# Patient Record
Sex: FEMALE | Race: OTHER | NOT HISPANIC OR LATINO | URBAN - METROPOLITAN AREA
[De-identification: names, ages, dates, MRNs, and addresses within clinical notes are randomized per-mention and may not be internally consistent; named-entity substitution may affect disease eponyms.]

---

## 2018-11-18 ENCOUNTER — EMERGENCY (EMERGENCY)
Facility: HOSPITAL | Age: 83
LOS: 1 days | Discharge: HOME | End: 2018-11-18
Attending: EMERGENCY MEDICINE | Admitting: EMERGENCY MEDICINE
Payer: MEDICARE

## 2018-11-18 VITALS
HEIGHT: 59 IN | OXYGEN SATURATION: 99 % | TEMPERATURE: 98 F | RESPIRATION RATE: 85 BRPM | WEIGHT: 95.46 LBS | HEART RATE: 85 BPM | SYSTOLIC BLOOD PRESSURE: 175 MMHG | DIASTOLIC BLOOD PRESSURE: 91 MMHG

## 2018-11-18 DIAGNOSIS — R41.3 OTHER AMNESIA: ICD-10-CM

## 2018-11-18 LAB
ANION GAP SERPL CALC-SCNC: 10 MMOL/L — SIGNIFICANT CHANGE UP (ref 5–17)
APPEARANCE UR: CLEAR — SIGNIFICANT CHANGE UP
BASOPHILS NFR BLD AUTO: 0.4 % — SIGNIFICANT CHANGE UP (ref 0–2)
BILIRUB UR-MCNC: NEGATIVE — SIGNIFICANT CHANGE UP
BUN SERPL-MCNC: 10 MG/DL — SIGNIFICANT CHANGE UP (ref 7–23)
CALCIUM SERPL-MCNC: 8.7 MG/DL — SIGNIFICANT CHANGE UP (ref 8.4–10.5)
CHLORIDE SERPL-SCNC: 102 MMOL/L — SIGNIFICANT CHANGE UP (ref 96–108)
CO2 SERPL-SCNC: 27 MMOL/L — SIGNIFICANT CHANGE UP (ref 22–31)
COLOR SPEC: YELLOW — SIGNIFICANT CHANGE UP
CREAT SERPL-MCNC: 0.39 MG/DL — LOW (ref 0.5–1.3)
DIFF PNL FLD: NEGATIVE — SIGNIFICANT CHANGE UP
EOSINOPHIL NFR BLD AUTO: 1 % — SIGNIFICANT CHANGE UP (ref 0–6)
GLUCOSE SERPL-MCNC: 97 MG/DL — SIGNIFICANT CHANGE UP (ref 70–99)
GLUCOSE UR QL: NEGATIVE — SIGNIFICANT CHANGE UP
HCT VFR BLD CALC: 36.5 % — SIGNIFICANT CHANGE UP (ref 34.5–45)
HGB BLD-MCNC: 12 G/DL — SIGNIFICANT CHANGE UP (ref 11.5–15.5)
KETONES UR-MCNC: NEGATIVE — SIGNIFICANT CHANGE UP
LEUKOCYTE ESTERASE UR-ACNC: NEGATIVE — SIGNIFICANT CHANGE UP
LYMPHOCYTES # BLD AUTO: 21.3 % — SIGNIFICANT CHANGE UP (ref 13–44)
MCHC RBC-ENTMCNC: 30.8 PG — SIGNIFICANT CHANGE UP (ref 27–34)
MCHC RBC-ENTMCNC: 32.9 G/DL — SIGNIFICANT CHANGE UP (ref 32–36)
MCV RBC AUTO: 93.8 FL — SIGNIFICANT CHANGE UP (ref 80–100)
MONOCYTES NFR BLD AUTO: 11.2 % — SIGNIFICANT CHANGE UP (ref 2–14)
NEUTROPHILS NFR BLD AUTO: 66.1 % — SIGNIFICANT CHANGE UP (ref 43–77)
NITRITE UR-MCNC: NEGATIVE — SIGNIFICANT CHANGE UP
PH UR: 8.5 — HIGH (ref 5–8)
PLATELET # BLD AUTO: 312 K/UL — SIGNIFICANT CHANGE UP (ref 150–400)
POTASSIUM SERPL-MCNC: 3.8 MMOL/L — SIGNIFICANT CHANGE UP (ref 3.5–5.3)
POTASSIUM SERPL-SCNC: 3.8 MMOL/L — SIGNIFICANT CHANGE UP (ref 3.5–5.3)
PROT UR-MCNC: NEGATIVE MG/DL — SIGNIFICANT CHANGE UP
RBC # BLD: 3.89 M/UL — SIGNIFICANT CHANGE UP (ref 3.8–5.2)
RBC # FLD: 14.3 % — SIGNIFICANT CHANGE UP (ref 10.3–16.9)
SODIUM SERPL-SCNC: 139 MMOL/L — SIGNIFICANT CHANGE UP (ref 135–145)
SP GR SPEC: <=1.005 — SIGNIFICANT CHANGE UP (ref 1–1.03)
UROBILINOGEN FLD QL: 0.2 E.U./DL — SIGNIFICANT CHANGE UP
WBC # BLD: 5.3 K/UL — SIGNIFICANT CHANGE UP (ref 3.8–10.5)
WBC # FLD AUTO: 5.3 K/UL — SIGNIFICANT CHANGE UP (ref 3.8–10.5)

## 2018-11-18 PROCEDURE — 99284 EMERGENCY DEPT VISIT MOD MDM: CPT

## 2018-11-18 PROCEDURE — 70450 CT HEAD/BRAIN W/O DYE: CPT

## 2018-11-18 PROCEDURE — 85025 COMPLETE CBC W/AUTO DIFF WBC: CPT

## 2018-11-18 PROCEDURE — 80048 BASIC METABOLIC PNL TOTAL CA: CPT

## 2018-11-18 PROCEDURE — 87086 URINE CULTURE/COLONY COUNT: CPT

## 2018-11-18 PROCEDURE — 87186 SC STD MICRODIL/AGAR DIL: CPT

## 2018-11-18 PROCEDURE — 82962 GLUCOSE BLOOD TEST: CPT

## 2018-11-18 PROCEDURE — 70450 CT HEAD/BRAIN W/O DYE: CPT | Mod: 26

## 2018-11-18 PROCEDURE — 99284 EMERGENCY DEPT VISIT MOD MDM: CPT | Mod: 25

## 2018-11-18 PROCEDURE — 81003 URINALYSIS AUTO W/O SCOPE: CPT

## 2018-11-18 RX ORDER — AMLODIPINE BESYLATE 2.5 MG/1
5 TABLET ORAL ONCE
Qty: 0 | Refills: 0 | Status: COMPLETED | OUTPATIENT
Start: 2018-11-18 | End: 2018-11-18

## 2018-11-18 RX ADMIN — AMLODIPINE BESYLATE 5 MILLIGRAM(S): 2.5 TABLET ORAL at 21:21

## 2018-11-18 NOTE — ED ADULT NURSE NOTE - NSIMPLEMENTINTERV_GEN_ALL_ED
Implemented All Universal Safety Interventions:  [place] to call system. Call bell, personal items and telephone within reach. Instruct patient to call for assistance. Room bathroom lighting operational. Non-slip footwear when patient is off stretcher. Physically safe environment: no spills, clutter or unnecessary equipment. Stretcher in lowest position, wheels locked, appropriate side rails in place.

## 2018-11-18 NOTE — ED PROVIDER NOTE - PHYSICAL EXAMINATION
Constitutional: Well appearing, well nourished, awake, alert, oriented to person, place, and in no apparent distress.  Head atraumatic, normocephalic. No signs of trauma  ENMT: Airway patent. Normal MM  Eyes: Clear bilaterally. PERRL. EOMI.   Cardiac: Normal rate, regular rhythm.  Heart sounds S1, S2.  No murmurs, rubs or gallops.  Respiratory: Breaths sounds equal and clear b/l. No increased WOB, tachypnea, hypoxia, or accessory mm use. Pt speaks in full sentences.   Gastrointestinal: Abd soft, NT, ND, NABS. No guarding, rebound, or rigidity. No pulsatile abdominal masses. No organomegaly appreciated. No CVAT   Musculoskeletal: Range of motion is not limited. FROM all joints x 4 ext w/o dec ROM, pain, or signs of trauma  Neuro: Alert & Oriented x 3. CN II-XII intact. No facial droop. Clear speech. TAYLOR w/ 5/5 strength x 4 ext. Normal sensation. No pronator drift. No dysdidokinesia nor dysmetria. Normal heel-to-shin. Normal gait  Skin: Skin normal color for race, warm, dry and intact. No evidence of rash.  Psych: Alert and oriented to person, place. normal mood and affect. no apparent risk to self or others.

## 2018-11-18 NOTE — ED PROVIDER NOTE - PROGRESS NOTE DETAILS
D/w MICHEAL Titus: given no acute medical issues, pt can be discharge and CM will f/u with the pt tomorrow. Pt wants to go home. Pt ambulating in the ED w/o difficulty. Will get pt transportation home to ensure safe arrival.

## 2018-11-18 NOTE — ED ADULT TRIAGE NOTE - CHIEF COMPLAINT QUOTE
" I had a blood in my urine" Patient also unable to recall the reason for visit at beginning of triage, looks confused, short term memory loss

## 2018-11-18 NOTE — ED ADULT NURSE NOTE - OBJECTIVE STATEMENT
patient presents to the ed complaining of hematuria that she noted today. patient denies chest pain, shortness of breath, nausea, vomiting, diarrhea, and/or burning with urination. patient is a poor historian, alert and oriented x2 with periods of forgetfulness

## 2018-11-18 NOTE — ED PROVIDER NOTE - OBJECTIVE STATEMENT
Pt w/ no sig PMHx walked into the ED for unclear reasons. Pt reports she does not recall why she is here. Pt reports she lives nearby on [address], by herself. Pt reports she was going to a store when she decided to come here. Pt's triage reports hematuria, but when asked about urine complaints, pt denies. Pt reports she has no family here. Pt reports her closest friend lives in [place], and states she does not want me to call him / her. Pt denies medical problems and states she does not take any medications. Denies HA, blurred vision, neck pain, paresthesias, focal weakness, CP, SOB, abd pain, dysuria. Denies recent falls. Denies pain of any kind. Pt admits she is forgetful, and is uncertain if she is always forgetful.

## 2018-11-18 NOTE — ED PROVIDER NOTE - MEDICAL DECISION MAKING DETAILS
Pt presented to the ED for unclear reasons. Pt afebrile rectally, non toxic. No evidence of infection by exam or hx. Pt not alert to time, and with short term memory loss, but otherwise clear in speaking, and does not appear confused. Non focal neuro exam. no signs of trauma. Check labs, UA, CT head. Asymptomatic HTN, will give medication. Dispo pending w/u and clinical status

## 2018-11-19 VITALS
SYSTOLIC BLOOD PRESSURE: 168 MMHG | RESPIRATION RATE: 18 BRPM | DIASTOLIC BLOOD PRESSURE: 80 MMHG | TEMPERATURE: 98 F | OXYGEN SATURATION: 100 % | HEART RATE: 80 BPM

## 2018-12-03 ENCOUNTER — EMERGENCY (EMERGENCY)
Facility: HOSPITAL | Age: 83
LOS: 1 days | Discharge: HOME | End: 2018-12-03
Attending: EMERGENCY MEDICINE | Admitting: EMERGENCY MEDICINE
Payer: MEDICARE

## 2018-12-03 VITALS
TEMPERATURE: 97 F | HEART RATE: 98 BPM | SYSTOLIC BLOOD PRESSURE: 130 MMHG | WEIGHT: 97 LBS | DIASTOLIC BLOOD PRESSURE: 67 MMHG | RESPIRATION RATE: 18 BRPM | OXYGEN SATURATION: 99 %

## 2018-12-03 DIAGNOSIS — N93.9 ABNORMAL UTERINE AND VAGINAL BLEEDING, UNSPECIFIED: ICD-10-CM

## 2018-12-03 PROCEDURE — 99282 EMERGENCY DEPT VISIT SF MDM: CPT

## 2018-12-03 PROCEDURE — 93010 ELECTROCARDIOGRAM REPORT: CPT

## 2018-12-03 PROCEDURE — 99285 EMERGENCY DEPT VISIT HI MDM: CPT | Mod: 25

## 2018-12-04 VITALS
SYSTOLIC BLOOD PRESSURE: 159 MMHG | HEART RATE: 85 BPM | OXYGEN SATURATION: 98 % | DIASTOLIC BLOOD PRESSURE: 89 MMHG | TEMPERATURE: 98 F | RESPIRATION RATE: 19 BRPM

## 2018-12-04 DIAGNOSIS — F03.90 UNSPECIFIED DEMENTIA WITHOUT BEHAVIORAL DISTURBANCE: ICD-10-CM

## 2018-12-04 LAB
ALBUMIN SERPL ELPH-MCNC: 4.2 G/DL — SIGNIFICANT CHANGE UP (ref 3.3–5)
ALP SERPL-CCNC: 114 U/L — SIGNIFICANT CHANGE UP (ref 40–120)
ALT FLD-CCNC: 21 U/L — SIGNIFICANT CHANGE UP (ref 10–45)
ANION GAP SERPL CALC-SCNC: 11 MMOL/L — SIGNIFICANT CHANGE UP (ref 5–17)
APPEARANCE UR: CLEAR — SIGNIFICANT CHANGE UP
APTT BLD: 30.1 SEC — SIGNIFICANT CHANGE UP (ref 27.5–36.3)
AST SERPL-CCNC: 27 U/L — SIGNIFICANT CHANGE UP (ref 10–40)
BASOPHILS NFR BLD AUTO: 0.4 % — SIGNIFICANT CHANGE UP (ref 0–2)
BILIRUB SERPL-MCNC: 0.3 MG/DL — SIGNIFICANT CHANGE UP (ref 0.2–1.2)
BILIRUB UR-MCNC: NEGATIVE — SIGNIFICANT CHANGE UP
BLD GP AB SCN SERPL QL: NEGATIVE — SIGNIFICANT CHANGE UP
BUN SERPL-MCNC: 17 MG/DL — SIGNIFICANT CHANGE UP (ref 7–23)
CALCIUM SERPL-MCNC: 8.5 MG/DL — SIGNIFICANT CHANGE UP (ref 8.4–10.5)
CHLORIDE SERPL-SCNC: 107 MMOL/L — SIGNIFICANT CHANGE UP (ref 96–108)
CO2 SERPL-SCNC: 25 MMOL/L — SIGNIFICANT CHANGE UP (ref 22–31)
COLOR SPEC: YELLOW — SIGNIFICANT CHANGE UP
CREAT SERPL-MCNC: 0.42 MG/DL — LOW (ref 0.5–1.3)
DIFF PNL FLD: NEGATIVE — SIGNIFICANT CHANGE UP
EOSINOPHIL NFR BLD AUTO: 3.4 % — SIGNIFICANT CHANGE UP (ref 0–6)
GLUCOSE SERPL-MCNC: 136 MG/DL — HIGH (ref 70–99)
GLUCOSE UR QL: NEGATIVE — SIGNIFICANT CHANGE UP
HCT VFR BLD CALC: 35.6 % — SIGNIFICANT CHANGE UP (ref 34.5–45)
HGB BLD-MCNC: 11.9 G/DL — SIGNIFICANT CHANGE UP (ref 11.5–15.5)
INR BLD: 0.97 — SIGNIFICANT CHANGE UP (ref 0.88–1.16)
KETONES UR-MCNC: NEGATIVE — SIGNIFICANT CHANGE UP
LEUKOCYTE ESTERASE UR-ACNC: NEGATIVE — SIGNIFICANT CHANGE UP
LYMPHOCYTES # BLD AUTO: 16.4 % — SIGNIFICANT CHANGE UP (ref 13–44)
MCHC RBC-ENTMCNC: 31.2 PG — SIGNIFICANT CHANGE UP (ref 27–34)
MCHC RBC-ENTMCNC: 33.4 G/DL — SIGNIFICANT CHANGE UP (ref 32–36)
MCV RBC AUTO: 93.2 FL — SIGNIFICANT CHANGE UP (ref 80–100)
MONOCYTES NFR BLD AUTO: 11.6 % — SIGNIFICANT CHANGE UP (ref 2–14)
NEUTROPHILS NFR BLD AUTO: 68.2 % — SIGNIFICANT CHANGE UP (ref 43–77)
NITRITE UR-MCNC: NEGATIVE — SIGNIFICANT CHANGE UP
PCP SPEC-MCNC: SIGNIFICANT CHANGE UP
PH UR: 7 — SIGNIFICANT CHANGE UP (ref 5–8)
PLATELET # BLD AUTO: 348 K/UL — SIGNIFICANT CHANGE UP (ref 150–400)
POTASSIUM SERPL-MCNC: 4.4 MMOL/L — SIGNIFICANT CHANGE UP (ref 3.5–5.3)
POTASSIUM SERPL-SCNC: 4.4 MMOL/L — SIGNIFICANT CHANGE UP (ref 3.5–5.3)
PROT SERPL-MCNC: 6.9 G/DL — SIGNIFICANT CHANGE UP (ref 6–8.3)
PROT UR-MCNC: NEGATIVE MG/DL — SIGNIFICANT CHANGE UP
PROTHROM AB SERPL-ACNC: 10.9 SEC — SIGNIFICANT CHANGE UP (ref 10–12.9)
RBC # BLD: 3.82 M/UL — SIGNIFICANT CHANGE UP (ref 3.8–5.2)
RBC # FLD: 14.2 % — SIGNIFICANT CHANGE UP (ref 10.3–16.9)
RH IG SCN BLD-IMP: POSITIVE — SIGNIFICANT CHANGE UP
SODIUM SERPL-SCNC: 143 MMOL/L — SIGNIFICANT CHANGE UP (ref 135–145)
SP GR SPEC: 1.02 — SIGNIFICANT CHANGE UP (ref 1–1.03)
UROBILINOGEN FLD QL: 0.2 E.U./DL — SIGNIFICANT CHANGE UP
WBC # BLD: 5 K/UL — SIGNIFICANT CHANGE UP (ref 3.8–10.5)
WBC # FLD AUTO: 5 K/UL — SIGNIFICANT CHANGE UP (ref 3.8–10.5)

## 2018-12-04 PROCEDURE — 86850 RBC ANTIBODY SCREEN: CPT

## 2018-12-04 PROCEDURE — 85025 COMPLETE CBC W/AUTO DIFF WBC: CPT

## 2018-12-04 PROCEDURE — 70450 CT HEAD/BRAIN W/O DYE: CPT

## 2018-12-04 PROCEDURE — 70450 CT HEAD/BRAIN W/O DYE: CPT | Mod: 26,77

## 2018-12-04 PROCEDURE — 81003 URINALYSIS AUTO W/O SCOPE: CPT

## 2018-12-04 PROCEDURE — 96374 THER/PROPH/DIAG INJ IV PUSH: CPT

## 2018-12-04 PROCEDURE — 99285 EMERGENCY DEPT VISIT HI MDM: CPT | Mod: 25

## 2018-12-04 PROCEDURE — 80053 COMPREHEN METABOLIC PANEL: CPT

## 2018-12-04 PROCEDURE — 86900 BLOOD TYPING SEROLOGIC ABO: CPT

## 2018-12-04 PROCEDURE — 86901 BLOOD TYPING SEROLOGIC RH(D): CPT

## 2018-12-04 PROCEDURE — 93005 ELECTROCARDIOGRAM TRACING: CPT

## 2018-12-04 PROCEDURE — 85730 THROMBOPLASTIN TIME PARTIAL: CPT

## 2018-12-04 PROCEDURE — 80307 DRUG TEST PRSMV CHEM ANLYZR: CPT

## 2018-12-04 PROCEDURE — 87086 URINE CULTURE/COLONY COUNT: CPT

## 2018-12-04 PROCEDURE — 70450 CT HEAD/BRAIN W/O DYE: CPT | Mod: 26

## 2018-12-04 PROCEDURE — 85610 PROTHROMBIN TIME: CPT

## 2018-12-04 PROCEDURE — 36415 COLL VENOUS BLD VENIPUNCTURE: CPT

## 2018-12-04 PROCEDURE — 90792 PSYCH DIAG EVAL W/MED SRVCS: CPT | Mod: GT

## 2018-12-04 PROCEDURE — 76856 US EXAM PELVIC COMPLETE: CPT

## 2018-12-04 PROCEDURE — 76856 US EXAM PELVIC COMPLETE: CPT | Mod: 26

## 2018-12-04 RX ORDER — MORPHINE SULFATE 50 MG/1
2 CAPSULE, EXTENDED RELEASE ORAL ONCE
Qty: 0 | Refills: 0 | Status: DISCONTINUED | OUTPATIENT
Start: 2018-12-04 | End: 2018-12-04

## 2018-12-04 RX ORDER — HALOPERIDOL DECANOATE 100 MG/ML
0.5 INJECTION INTRAMUSCULAR ONCE
Qty: 0 | Refills: 0 | Status: DISCONTINUED | OUTPATIENT
Start: 2018-12-04 | End: 2018-12-07

## 2018-12-04 RX ORDER — SODIUM CHLORIDE 9 MG/ML
1000 INJECTION INTRAMUSCULAR; INTRAVENOUS; SUBCUTANEOUS ONCE
Qty: 0 | Refills: 0 | Status: COMPLETED | OUTPATIENT
Start: 2018-12-04 | End: 2018-12-04

## 2018-12-04 RX ADMIN — SODIUM CHLORIDE 1000 MILLILITER(S): 9 INJECTION INTRAMUSCULAR; INTRAVENOUS; SUBCUTANEOUS at 04:30

## 2018-12-04 RX ADMIN — MORPHINE SULFATE 2 MILLIGRAM(S): 50 CAPSULE, EXTENDED RELEASE ORAL at 04:29

## 2018-12-04 RX ADMIN — MORPHINE SULFATE 2 MILLIGRAM(S): 50 CAPSULE, EXTENDED RELEASE ORAL at 02:21

## 2018-12-04 NOTE — ED ADULT NURSE NOTE - CHPI ED NUR SYMPTOMS NEG
no pain/no discharge/no fever/no nausea/no back pain/no abdominal pain/no coffee grounds emesis/no vomiting

## 2018-12-04 NOTE — CONSULT NOTE ADULT - ASSESSMENT
84y post menopausal G0 presenting to ED today for vaginal bleeding. Patient currently has altered mental status and unable to fully evaluate.   - patient uncooperative and refused vaginal exam and refused transvaginal ultrasound   -patient is hemodynamically stable, Hemoglobin  stable, and scant blood noted on maxi pad, thus urgent vaginal exam is not necessary at this time, would recommend that patient follow up outpatient with GYN provider to evaluate abnormal post menopausal vaginal bleeding including a transvaginal ultrasound to full evaluate endometrial lining  - recommend that patient is admitted to medicine/neurology/psychiatry given altered mental status at this time with no care takers available for patient and unable to fully evaluate patient   -gyn will continue to follow patient if admitted and once patient is more orientated than will reevaluate for possible vaginal exam and sonogram  - if bleeding increases significantly or patient becomes hemodynamically unstable, please urgently contact GYN team at  [phone]  plan d/w Dr. Marianne Cabrera

## 2018-12-04 NOTE — ED BEHAVIORAL HEALTH ASSESSMENT NOTE - OTHER
unclear thin intermittently cooperative with laina pt in bed denies SI/HI. Likely delusions/confabulations regarding working pt does not participate in full cognitive/ language evaluation

## 2018-12-04 NOTE — ED BEHAVIORAL HEALTH ASSESSMENT NOTE - SUMMARY
Patient is an 85 y/o F [place] single, noncaregiver, reportedly domiciled alone, reportedly employed "in the decorative market", with unclear PPhx or PMhx, denies hx of inpt hospitalizations, denies hx of suicide attempts/violence, denies legal problems, and denies substance use. Patient self presents today for vaginal bleeding. Psych was consulted as patient appears confused, forgetful, and disoriented. On evaluation pt appears to have gross cognitive deficits, however she does not fully participate in more detailed cognitive testing. She is unable to reasonably discuss her ability to care for ADLs at home and does not appear to have any social supports. At this time pt requires further medical/neuro workup in a safe setting. Would recommend medical admission.

## 2018-12-04 NOTE — ED PROVIDER NOTE - PROGRESS NOTE DETAILS
GYN consulted for vaginal bleeding. Evaluated - will continue to follow progress. Neurology consulted - will evaluate and follow as per Dr. Quintana. Psych evaluated and will continue to follow. Neurosurgery consulted for subdural. signed out to [place] by Dr. Mata - presented for vaginal bleeding with minimal bleeding on exam, seen by GYN no acute intervention for bleeding, but pt altered and delusional and ct shows subdural without known trauma but pt confused, psych also eval pt in ED - pending consultation from NS at dispo.

## 2018-12-04 NOTE — CONSULT NOTE ADULT - SUBJECTIVE AND OBJECTIVE BOX
HISTORY OF PRESENT ILLNESS: 84F presented with above.  Patient is poor historian.  When asked about what brought her to the hospital today, she cannot recall why.  Per previous documentation, vaginal bleeding appears to be presenting symptom.  Given patient's poor mentation, CT scan was performed, which revealed mixed density interhemispheric SDH.  Upon further questioning, patients states that she hit her head on kitchen cabinet within the last week, but does not recall when exactly.  Denies anticoagulant use.  Denies headaches, n/v, visual disturbances, weakness.  Patient appears to be delusional, forgetful, and tangential during interview    PAST MEDICAL & SURGICAL HISTORY:  No pertinent past medical history  No significant past surgical history    FAMILY HISTORY:  -unable to obtain due to patient's mental status      SOCIAL HISTORY:  Tobacco Use: unknown  EtOH use: unknown  Substance: unknown    Allergies    No Known Allergies    Intolerances        REVIEW OF SYSTEMS  General: see HPI	  Skin/Breast: denies  Ophthalmologic: denies  ENMT:	denies  Respiratory and Thorax: denies  Cardiovascular:	denies  Gastrointestinal:	denies  Genitourinary:	see HPI  Musculoskeletal:	 denies  Neurological:	see HPI  Psychiatric:	see HPI  Hematology/Lymphatics: denies	  Endocrine:	denies  Allergic/Immunologic: denies	      MEDICATIONS:  Antibiotics:    Neuro:  haloperidol    Injectable 0.5 milliGRAM(s) IV Push Once PRN    Anticoagulation:    OTHER:    IVF:      Vital Signs Last 24 Hrs  T(C): 37 (04 Dec 2018 01:43), Max: 37 (04 Dec 2018 01:43)  T(F): 98.6 (04 Dec 2018 01:43), Max: 98.6 (04 Dec 2018 01:43)  HR: 90 (04 Dec 2018 01:43) (90 - 98)  BP: 114/61 (04 Dec 2018 01:43) (114/61 - 130/67)  BP(mean): --  RR: 18 (04 Dec 2018 01:43) (18 - 18)  SpO2: 98% (04 Dec 2018 01:43) (98% - 99%)    PHYSICAL EXAM:  Constitutional: NAD, alert and awake  Eyes: PERRL, EOMI  Respiratory: CTA throughout  Cardiovascular: S1 S2 RRR  Genitourinary: deferred  Neurological: Oriented x 2 (not time), follows commands, speech clear  TAYLOR 5/5 strength  Sensation intact light touch all dermatomes  CN II-XII grossly intact      LABS:                        11.9   5.0   )-----------( 348      ( 04 Dec 2018 00:23 )             35.6     12    143  |  107  |  17  ----------------------------<  136<H>  4.4   |  25  |  0.42<L>    Ca    8.5      04 Dec 2018 00:23    TPro  6.9  /  Alb  4.2  /  TBili  0.3  /  DBili  x   /  AST  27  /  ALT  21  /  AlkPhos  114  1204    PT/INR - ( 04 Dec 2018 00:24 )   PT: 10.9 sec;   INR: 0.97          PTT - ( 04 Dec 2018 00:24 )  PTT:30.1 sec  Urinalysis Basic - ( 04 Dec 2018 05:56 )    Color: Yellow / Appearance: Clear / S.020 / pH: x  Gluc: x / Ketone: NEGATIVE  / Bili: Negative / Urobili: 0.2 E.U./dL   Blood: x / Protein: NEGATIVE mg/dL / Nitrite: NEGATIVE   Leuk Esterase: NEGATIVE / RBC: x / WBC x   Sq Epi: x / Non Sq Epi: x / Bacteria: x        RADIOLOGY & ADDITIONAL STUDIES: See HPI    A/P:  84F w/ interhemispheric SDH s/p trauma date unknown, neuro intact  -Repeat head CT 6 hours to ensure stability  -If stable can be discharged from ED  -No anti seizure meds necessary due to size and location of subdural and potential to worsen mental status if administered  -Case, plan and imaging discussed with Dr. Denis  -Will review repeat imaging when performed

## 2018-12-04 NOTE — ED ADULT NURSE NOTE - NSIMPLEMENTINTERV_GEN_ALL_ED
Implemented All Fall with Harm Risk Interventions:  [place] to call system. Call bell, personal items and telephone within reach. Instruct patient to call for assistance. Room bathroom lighting operational. Non-slip footwear when patient is off stretcher. Physically safe environment: no spills, clutter or unnecessary equipment. Stretcher in lowest position, wheels locked, appropriate side rails in place. Provide visual cue, wrist band, yellow gown, etc. Monitor gait and stability. Monitor for mental status changes and reorient to person, place, and time. Review medications for side effects contributing to fall risk. Reinforce activity limits and safety measures with patient and family. Provide visual clues: red socks.

## 2018-12-04 NOTE — ED PROVIDER NOTE - OBJECTIVE STATEMENT
83 y/o female present in ED c/o vaginal bleeding x1 day. Pt reports noticing bright red blood in her underwear earlier today. She describes the blood as bright red. 85 y/o female present in ED c/o vaginal bleeding x1 day. Pt reports noticing bright red blood in her underwear earlier today. She describes the blood as bright red. States she soiled x1 maxi pad. She denies the following: dizziness, sob, difficulty breathing, hx of vaginal bleeding, hemorrhoids, fissure, painful bm, trauma, pelvic pain, urinary symptoms.

## 2018-12-04 NOTE — ED PROVIDER NOTE - ATTENDING CONTRIBUTION TO CARE
I have seen/examined the pt, reviewed all pertinent clinical data, and I agree with the documentation, care, and plan executed by RAGHU Edward.

## 2018-12-04 NOTE — ED BEHAVIORAL HEALTH ASSESSMENT NOTE - RISK ASSESSMENT
Risk factors include no social supports, gross cognitive deficits, unable to reasonably discuss her ability to care for ADLs, poor insight into illness.

## 2018-12-04 NOTE — ED BEHAVIORAL HEALTH ASSESSMENT NOTE - DESCRIPTION
calm and cooperative on arrival, initially states she is here for vaginal bleeding. Later during EM evaluation pt does not remember why she is in the hospital. She was initially resistant to psych eval, but was able to minimally participate with cooperation. Nothing of note found in patient's belongings (no contact sources found). Patient is not agitated and did not require PRNs. pt received morphine 2mg IV for pelvic pain.    CBC, coags, and CMP WNL. Pelvic US completed (nondiagnostic).    Vital Signs Last 24 Hrs  T(C): 37 (04 Dec 2018 01:43), Max: 37 (04 Dec 2018 01:43)  T(F): 98.6 (04 Dec 2018 01:43), Max: 98.6 (04 Dec 2018 01:43)  HR: 90 (04 Dec 2018 01:43) (90 - 98)  BP: 114/61 (04 Dec 2018 01:43) (114/61 - 130/67)  BP(mean): --  RR: 18 (04 Dec 2018 01:43) (18 - 18)  SpO2: 98% (04 Dec 2018 01:43) (98% - 99%) denies see HPI

## 2018-12-04 NOTE — ED PROVIDER NOTE - MEDICAL DECISION MAKING DETAILS
85 y/o female well-appearing. Upon initial assessment, pt states she does not know why she is here in the ED. States she was taking a walk. With further evaluation, she remembered having vaginal bleeding with some discomfort. Mild trace bleeding. No rectal bleeding noted. 83 y/o female well-appearing. Upon initial assessment, pt states she does not know why she is here in the ED. States she was taking a walk. With further evaluation, she remembered having vaginal bleeding with some discomfort. Mild trace bleeding. No rectal bleeding noted.   Patient with multiple complaints. Second ct scan negative discussed with Neurosx. Will d/ c and have pt follow up with PMD.

## 2018-12-04 NOTE — ED BEHAVIORAL HEALTH ASSESSMENT NOTE - HPI (INCLUDE ILLNESS QUALITY, SEVERITY, DURATION, TIMING, CONTEXT, MODIFYING FACTORS, ASSOCIATED SIGNS AND SYMPTOMS)
Patient is an 85 y/o F [place], noncaregiver, reportedly domiciled alone, reportedly employed "in the decorative market", with unclear PPhx or PMhx, denies hx of inpt hospitalizations, denies hx of suicide attempts/violence, denies legal problems, and denies substance use. Patient self presents today for vaginal bleeding. Psych was consulted as patient appears confused, forgetful, and disoriented on evaluation.     Patient is initually resistant to speaking with a psychiatrist, stating she is here for vaginal bleeding only. She is open to speak with encouragement. She states that "things have been hectic" because "the company I've been working for has re-situated". Patient states she works "in the [employer] market" and is going to work in the AM. She reports also feeling stressed because "many friends have passed" and reports she does not have any living family or friends. She states that she been having difficulty organizing her house as there is "too much to do". She states that she lives and functions alone and independently. She is oriented to self and place, however she is unable to state the date, year, season, or upcoming [place] holiday. She states that she is "too sleepy" to answer these questions. She is unwilling to participate in further cognitive evaluation. She otherwise denies all complaints, stating her sleep, appetite, and energy level are all "fine". She reports her mood to be "good" and she is "loving life". She denies AVH or paranoid ideation. She denies SI/HI/I/P, urges for SIB, aggressive I/I/P, or access to guns. She denies recent substance use but states she will have wine "socially with friends" (despite stating previously that she has no friends). She denies recent weight changes. She denies any physical complaints other than pelvic complaints. She declines to go into details regarding her ability to care for ADLs. She states that she does not have any collateral sources available at this time.

## 2018-12-04 NOTE — ED BEHAVIORAL HEALTH ASSESSMENT NOTE - INDICATION
1:1 CO or enhanced observation due to disorganization/elopement precautions. Patient does not have capacity to leave AMA

## 2018-12-04 NOTE — CONSULT NOTE ADULT - SUBJECTIVE AND OBJECTIVE BOX
84y post menopausal G0 presenting to ED today for vaginal bleeding. On evaluation patient is confused as to why she came to the emergency room, reporting that she is tired and ready to go home. Based on history obtained from ED provider patient came to ED for vaginal bleeding that she noted today, filled about half of one pad and then had some streaks of blood on her underwear. ED provider performed exam which showed atrophic vaginal mucosa and scant blood coming from os. When patient is questioned about vaginal bleeding, she confirms that she had bleeding today but unsure of how much or how long it has been happening. Reports that she lives at home alone in the [facility]. Has not seen a doctor in many years.  Reports her last period was more than 30 years ago. Denies any physical or mental abuse.  Denies any trauma recently to her genital, has not had sexual intercourse in about one month, and denies any insertion of toys/tampons in vagina. Denies any pain, feeling lightheaded, weak, dizzy, nauseated, or feverish. Her only compliant is that she feels very tired and wants to go home and go to bed. Denies any family or friends in area. Unable to provide any information on possible contacts.     OB H/x: denies    GYN H/x: reports a history of irregular menstruation, has not seen a GYN provider in many years      PAST MEDICAL & SURGICAL HISTORY:  No pertinent past medical history  No significant past surgical history      MEDICATIONS  (STANDING): denies      Allergies No Known Allergies    Vital Signs Last 24 Hrs  T(C): 37 (04 Dec 2018 01:43), Max: 37 (04 Dec 2018 01:43)  T(F): 98.6 (04 Dec 2018 01:43), Max: 98.6 (04 Dec 2018 01:43)  HR: 90 (04 Dec 2018 01:43) (90 - 98)  BP: 114/61 (04 Dec 2018 01:43) (114/61 - 130/67)  BP(mean): --  RR: 18 (04 Dec 2018 01:43) (18 - 18)  SpO2: 98% (04 Dec 2018 01:43) (98% - 99%)    Physical Exam:  Gen: no acute distress, comfortable, A&Ox2 to person and place  Psych: tangential, unable to redirect, combative, uncooperative, scattered thoughts   GI: refused exam   BME: refused exam  Spec:  refused exam     LABS:                        11.9   5.0   )-----------( 348      ( 04 Dec 2018 00:23 )             35.6     12-04    143  |  107  |  17  ----------------------------<  136<H>  4.4   |  25  |  0.42<L>    Ca    8.5      04 Dec 2018 00:23    TPro  6.9  /  Alb  4.2  /  TBili  0.3  /  DBili  x   /  AST  27  /  ALT  21  /  AlkPhos  114  12-04    PT/INR - ( 04 Dec 2018 00:24 )   PT: 10.9 sec;   INR: 0.97          PTT - ( 04 Dec 2018 00:24 )  PTT:30.1 sec      RADIOLOGY & ADDITIONAL STUDIES:

## 2018-12-05 LAB
CULTURE RESULTS: SIGNIFICANT CHANGE UP
SPECIMEN SOURCE: SIGNIFICANT CHANGE UP

## 2018-12-06 LAB — DRUG SCREEN, SERUM: SIGNIFICANT CHANGE UP

## 2024-08-06 NOTE — ED BEHAVIORAL HEALTH ASSESSMENT NOTE - NS ED [facility] MED ROS GASTROINTESTINAL
Pt and sister on the phone. Name and  verified. Meet and greet scheduled. Pt aware that records will be reviewed by provider at that time and provider will determine if appropriate for midwife care.   No complaints

## 2025-07-02 NOTE — ED ADULT NURSE NOTE - NS PRO PASSIVE SMOKE EXP
No
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